# Patient Record
Sex: MALE | Race: WHITE | NOT HISPANIC OR LATINO | Employment: FULL TIME | URBAN - NONMETROPOLITAN AREA
[De-identification: names, ages, dates, MRNs, and addresses within clinical notes are randomized per-mention and may not be internally consistent; named-entity substitution may affect disease eponyms.]

---

## 2023-09-27 ENCOUNTER — OFFICE VISIT (OUTPATIENT)
Dept: URGENT CARE | Facility: PHYSICIAN GROUP | Age: 38
End: 2023-09-27
Payer: COMMERCIAL

## 2023-09-27 VITALS
DIASTOLIC BLOOD PRESSURE: 80 MMHG | SYSTOLIC BLOOD PRESSURE: 128 MMHG | OXYGEN SATURATION: 96 % | BODY MASS INDEX: 32.64 KG/M2 | TEMPERATURE: 98.3 F | WEIGHT: 228 LBS | RESPIRATION RATE: 22 BRPM | HEART RATE: 100 BPM | HEIGHT: 70 IN

## 2023-09-27 DIAGNOSIS — J02.0 STREP THROAT: ICD-10-CM

## 2023-09-27 DIAGNOSIS — J02.9 SORE THROAT: ICD-10-CM

## 2023-09-27 DIAGNOSIS — R52 GENERALIZED BODY ACHES: ICD-10-CM

## 2023-09-27 DIAGNOSIS — R50.9 FEVER AND CHILLS: ICD-10-CM

## 2023-09-27 LAB
FLUAV RNA SPEC QL NAA+PROBE: NEGATIVE
FLUBV RNA SPEC QL NAA+PROBE: NEGATIVE
RSV RNA SPEC QL NAA+PROBE: NEGATIVE
S PYO DNA SPEC NAA+PROBE: DETECTED
SARS-COV-2 RNA RESP QL NAA+PROBE: NEGATIVE

## 2023-09-27 PROCEDURE — 3079F DIAST BP 80-89 MM HG: CPT | Performed by: PHYSICIAN ASSISTANT

## 2023-09-27 PROCEDURE — 0241U POCT CEPHEID COV-2, FLU A/B, RSV - PCR: CPT | Performed by: PHYSICIAN ASSISTANT

## 2023-09-27 PROCEDURE — 99203 OFFICE O/P NEW LOW 30 MIN: CPT | Performed by: PHYSICIAN ASSISTANT

## 2023-09-27 PROCEDURE — 87651 STREP A DNA AMP PROBE: CPT | Performed by: PHYSICIAN ASSISTANT

## 2023-09-27 PROCEDURE — 3074F SYST BP LT 130 MM HG: CPT | Performed by: PHYSICIAN ASSISTANT

## 2023-09-27 RX ORDER — ARIPIPRAZOLE 2 MG/1
TABLET ORAL
COMMUNITY
Start: 2023-07-15

## 2023-09-27 RX ORDER — LEVOTHYROXINE SODIUM 0.03 MG/1
25 TABLET ORAL
COMMUNITY

## 2023-09-27 RX ORDER — AMOXICILLIN 500 MG/1
500 CAPSULE ORAL 2 TIMES DAILY
Qty: 20 CAPSULE | Refills: 0 | Status: SHIPPED | OUTPATIENT
Start: 2023-09-27 | End: 2023-10-07

## 2023-09-27 RX ORDER — DEXAMETHASONE 4 MG/1
4 TABLET ORAL 2 TIMES DAILY
Qty: 6 TABLET | Refills: 0 | Status: SHIPPED | OUTPATIENT
Start: 2023-09-27 | End: 2023-09-30

## 2023-09-27 RX ORDER — FENOFIBRATE 160 MG/1
160 TABLET ORAL DAILY
COMMUNITY
Start: 2023-07-01

## 2023-09-27 ASSESSMENT — ENCOUNTER SYMPTOMS
COUGH: 0
SWOLLEN GLANDS: 1
HEADACHES: 1
CHILLS: 1
MYALGIAS: 1
NAUSEA: 1
HOARSE VOICE: 0
FEVER: 1
SORE THROAT: 1
DIARRHEA: 0
VOMITING: 0

## 2023-09-27 NOTE — PROGRESS NOTES
Subjective     Chidi Mtz is a 37 y.o. male who presents with Sore Throat (X3-4 days), Sweats (Night sweat-soaking the bed), and Chills    PMH:  has no past medical history on file.  MEDS:   Current Outpatient Medications:     fenofibrate (TRIGLIDE) 160 MG tablet, Take 160 mg by mouth every day., Disp: , Rfl:     ARIPiprazole (ABILIFY) 2 MG tablet, TAKE 1 TABLET BY MOUTH ONCE DAILY AS DIRECTED, Disp: , Rfl:     levothyroxine (SYNTHROID) 25 MCG Tab, Take 25 mcg by mouth every morning on an empty stomach., Disp: , Rfl:     amoxicillin (AMOXIL) 500 MG Cap, Take 1 Capsule by mouth 2 times a day for 10 days., Disp: 20 Capsule, Rfl: 0    dexamethasone (DECADRON) 4 MG Tab, Take 1 Tablet by mouth 2 times a day for 3 days., Disp: 6 Tablet, Rfl: 0  ALLERGIES: No Known Allergies  SURGHX: History reviewed. No pertinent surgical history.  SOCHX:  reports that he has quit smoking. His smoking use included cigarettes. He has never used smokeless tobacco. He reports that he does not currently use alcohol. He reports that he does not use drugs.  FH: Reviewed with patient, not pertinent to this visit.           Patient presents with complaint of sore throat, fever and chills with night sweats.  Patient denies vomiting or diarrhea but has had some nausea.  Patient has been able to eat and drink but it has been painful.  Patient reports improvement in pain and fever with ibuprofen.  Patient denies congestion, runny nose, postnasal drip or cough.  PT is traveling for work so possible sick contacts on airplanes.      Pharyngitis   This is a new problem. Episode onset: 3 days. The problem has been gradually worsening. Neither side of throat is experiencing more pain than the other. The maximum temperature recorded prior to his arrival was 100.4 - 100.9 F. The fever has been present for 1 to 2 days. The pain is at a severity of 7/10. Associated symptoms include ear pain, headaches, a plugged ear sensation and swollen glands.  "Pertinent negatives include no congestion, coughing, diarrhea, drooling, ear discharge, hoarse voice or vomiting. He has tried cool liquids, NSAIDs and gargles for the symptoms. The treatment provided mild relief.       Review of Systems   Constitutional:  Positive for chills, fever and malaise/fatigue.   HENT:  Positive for ear pain and sore throat. Negative for congestion, drooling, ear discharge and hoarse voice.    Respiratory:  Negative for cough.    Gastrointestinal:  Positive for nausea. Negative for diarrhea and vomiting.   Musculoskeletal:  Positive for myalgias.   Neurological:  Positive for headaches.   All other systems reviewed and are negative.             Objective     /80   Pulse 100   Temp 36.8 °C (98.3 °F) (Oral)   Resp (!) 22   Ht 1.778 m (5' 10\")   Wt 103 kg (228 lb)   SpO2 96%   BMI 32.71 kg/m²      Physical Exam  Vitals and nursing note reviewed.   Constitutional:       General: He is not in acute distress.     Appearance: Normal appearance. He is well-developed. He is not ill-appearing or toxic-appearing.   HENT:      Head: Normocephalic.      Right Ear: Tympanic membrane normal.      Left Ear: Tympanic membrane normal.      Nose: Nose normal.      Mouth/Throat:      Lips: Pink.      Mouth: Mucous membranes are moist.      Pharynx: Uvula midline. Posterior oropharyngeal erythema present. No uvula swelling.      Tonsils: Tonsillar exudate present. No tonsillar abscesses. 3+ on the right. 3+ on the left.   Eyes:      Extraocular Movements: Extraocular movements intact.      Conjunctiva/sclera: Conjunctivae normal.      Pupils: Pupils are equal, round, and reactive to light.   Cardiovascular:      Rate and Rhythm: Normal rate and regular rhythm.      Heart sounds: Normal heart sounds.   Pulmonary:      Effort: Pulmonary effort is normal.      Breath sounds: Normal breath sounds.   Abdominal:      Palpations: Abdomen is soft.   Musculoskeletal:         General: Normal range of motion. "      Cervical back: Normal range of motion and neck supple.   Lymphadenopathy:      Cervical: Cervical adenopathy present.   Skin:     General: Skin is warm and dry.      Capillary Refill: Capillary refill takes less than 2 seconds.   Neurological:      Mental Status: He is alert and oriented to person, place, and time.      Gait: Gait normal.   Psychiatric:         Mood and Affect: Mood normal.                     Assessment & Plan                1. Strep throat  POCT CEPHEID GROUP A STREP - PCR    POCT CEPHEID COV-2, FLU A/B, RSV - PCR    amoxicillin (AMOXIL) 500 MG Cap    dexamethasone (DECADRON) 4 MG Tab      2. Fever and chills  POCT CEPHEID GROUP A STREP - PCR    POCT CEPHEID COV-2, FLU A/B, RSV - PCR    amoxicillin (AMOXIL) 500 MG Cap    dexamethasone (DECADRON) 4 MG Tab      3. Generalized body aches  POCT CEPHEID GROUP A STREP - PCR    POCT CEPHEID COV-2, FLU A/B, RSV - PCR    amoxicillin (AMOXIL) 500 MG Cap    dexamethasone (DECADRON) 4 MG Tab      4. Sore throat  POCT CEPHEID GROUP A STREP - PCR    POCT CEPHEID COV-2, FLU A/B, RSV - PCR    amoxicillin (AMOXIL) 500 MG Cap    dexamethasone (DECADRON) 4 MG Tab        Strep: Positive  COVID/flu/RSV/negative    Patient HPI and physical exam are consistent with strep throat, positive strep test diagnostic.  I will treat with amoxicillin 500 mg twice daily x10 days.  I have also sent a prescription for Decadron 4 mg twice daily x3 days as patient's tonsils are quite swollen and I feel he will benefit significantly from the decadron.     PT advised saltwater gargles/swishes  3-4 times daily until symptoms improve.     Motrin/Advil/Ibuprophen 600 mg every 6 hours as needed for pain or fever.    Differential diagnosis, supportive care, and indications for immediate follow-up discussed with patient.  Instructed to return to clinic or nearest emergency department for any change in condition, further concerns, or worsening of symptoms.    I personally reviewed prior  external notes and test results pertinent to today's visit.  I have independently reviewed and interpreted all diagnostics ordered during this urgent care visit.    PT should follow up with PCP in 1-2 days for re-evaluation if symptoms have not improved.      Discussed red flags and reasons to return to UC or ED.      Pt and/or family verbalized understanding of diagnosis and follow up instructions and was offered informational handout on diagnosis.  PT discharged.     Please note that this dictation was created using voice recognition software. I have made every reasonable attempt to correct obvious errors, but I expect that there may be errors of grammar and possibly content that I did not discover before finalizing the note.